# Patient Record
Sex: FEMALE | Race: WHITE
[De-identification: names, ages, dates, MRNs, and addresses within clinical notes are randomized per-mention and may not be internally consistent; named-entity substitution may affect disease eponyms.]

---

## 2017-03-16 ENCOUNTER — HOSPITAL ENCOUNTER (OUTPATIENT)
Dept: HOSPITAL 45 - C.RAD1850 | Age: 62
Discharge: HOME | End: 2017-03-16
Attending: INTERNAL MEDICINE
Payer: COMMERCIAL

## 2017-03-16 DIAGNOSIS — N20.0: Primary | ICD-10-CM

## 2017-03-16 DIAGNOSIS — K59.00: ICD-10-CM

## 2017-03-16 NOTE — DIAGNOSTIC IMAGING REPORT
KUB



CLINICAL HISTORY: Nephrolithiasis.    



COMPARISON STUDY:  CT of the abdomen and pelvis June 1, 2016. 



FINDINGS: Pelvic calcifications likely reflect phleboliths or vascular

calcifications. There is a suspected punctate calculus within the lower pole of

the left kidney. No ureteral calculi are identified. No right renal calculi are

identified although right renal shadow is largely obscured by stool.



IMPRESSION:  



1. Suspected left-sided nephrolithiasis.



2. No ureteral calculi identified.



3. Right renal shadow largely obscured by stool. 







Electronically signed by:  Xavi Walters M.D.

3/16/2017 11:43 AM



Dictated Date/Time:  3/16/2017 11:39 AM

## 2017-05-22 ENCOUNTER — HOSPITAL ENCOUNTER (OUTPATIENT)
Dept: HOSPITAL 45 - C.PAPS | Age: 62
Discharge: HOME | End: 2017-05-22
Attending: OBSTETRICS & GYNECOLOGY
Payer: COMMERCIAL

## 2017-05-22 DIAGNOSIS — Z01.419: Primary | ICD-10-CM

## 2017-07-05 ENCOUNTER — HOSPITAL ENCOUNTER (OUTPATIENT)
Dept: HOSPITAL 45 - C.RAD1850 | Age: 62
Discharge: HOME | End: 2017-07-05
Attending: UROLOGY
Payer: COMMERCIAL

## 2017-07-05 ENCOUNTER — HOSPITAL ENCOUNTER (OUTPATIENT)
Dept: HOSPITAL 45 - C.CTS | Age: 62
Discharge: HOME | End: 2017-07-05
Attending: UROLOGY
Payer: COMMERCIAL

## 2017-07-05 DIAGNOSIS — N20.0: Primary | ICD-10-CM

## 2017-07-05 DIAGNOSIS — K57.30: ICD-10-CM

## 2017-07-05 NOTE — DIAGNOSTIC IMAGING REPORT
ABDOMEN AND PELVIS CT WITHOUT CONTRAST



CT DOSE: 998.73 mGycm



HISTORY: Nephrocalcinosis  N20.0 Nephrolithiasis



TECHNIQUE: Multiaxial CT images of the abdomen and pelvis were performed without

contrast.



COMPARISON STUDY: 6/1/2016



FINDINGS: Minimal chronic interstitial change of the lung bases. Scarlike

density with subtle nodular character anterior medial right phrenic angle. This

measures 6 mm. No additional pulmonary nodular change. Liver spleen and pancreas

appear unremarkable. Right kidney demonstrates a nonobstructing calcification at

its lower pole measuring 4 mm. There is no evidence for a right-sided

obstructing calculus. Left kidney demonstrates several nonobstructing upper pole

renal calcifications. These measure up to 4 mm. Bowel pattern overall is

nonobstructive. The appendix is normal. There are several pelvic vascular

calcifications. There is a component of chronic sigmoid diverticulosis. There is

no evidence for acute diverticulitis.



IMPRESSION: 



1. Several nonobstructing renal calcifications bilaterally.





2. No evidence for an obstructing urinary tract calculus.

3. Chronic sigmoid diverticulosis.

4. No evidence for acute diverticulitis.

5. Possible developing nodular density right base measuring 6 mm. Follow-up per

Fleischner criteria.



Please refer to below summary of Fleischner criteria recommendations for

follow-up of incidental CT nodules (SARAH Farfan, Guidelines for management of

small pulmonary nodules detected on CT scans:  A statement from the Fleischner

[employer]ociety, Radiology 237: [phone].)



SOLID NODULES



Solitary nodule size: <6 mm

*  low risk patients:  no follow-up needed

*  high risk patients:  optional CT at 12 months



Solitary nodule size:  6-8 mm

*  low risk patients:  follow-up at 6-12 months, then consider further follow-up

at 18-24 months

*  high risk patients:  initial follow-up CT at 6-12 months and then at 18-24

months if no change



Solitary nodule size: >8 mm

*  either low or high risk patients - consider follow-up CT at 3 months, and/or

CT-PET, and/or biopsy



Multiple nodules size:  <6 mm

*  low risk patients:  no routine follow-up

*  high risk patients:  optional CT at 12 months



Multiple nodules size:  6-8 mm

*  low risk patients:  follow-up at 3-6 months, then consider further follow-up

at 18-24 months

*  high risk patients:  follow-up at 3-6 months, then at 18-24 months if no

change



Multiple nodules size:  >8 mm

*  low risk patients:  follow-up at 3-6 months, then consider further follow-up

at 18-24 months

*  high risk patients:  follow-up at 3-6 months, then at 18-24 months if no

change



Note:  newly detected indeterminate nodule in persons 35 years of age or older.

*  Low risk patients:  minimal or absent history of smoking and/or other known

risk factors

*  high risk patients:  history of smoking or of other known risk factors (e.g.

first degree relative with lung cancer, or exposure to asbestos, radon, uranium)

*  if a nodule up to 8 mm is partly solid or is ground glass further follow-up

is required after 24 months to exclude possible slow growing adenocarcinoma

(MEGHA)



SUBSOIL NODULES



Solitary pure ground-glass nodule

*  nodule size <6 mm - no CT follow-up required

*  nodule size >=6 mm - follow-up CT at 6-12 months, then every 2 years until 5

years



Solitary part-solid nodule

*  nodule size <6 mm - no CT follow-up required

*  nodule size >=6 mm - follow-up CT at 3-6 months.  If unchanged, and solid

component remains <6 mm, then annual follow-up for 5 years



Multiple subsolid nodules

*  nodule size <6 mm - follow-up CT at 3-6 months, consider further follow-up at

2 and 4 years if stable

*  nodule size >=6 mm - follow-up CT at 3-6 months, subsequent management based

on the most suspicious nodule(s)







        







Electronically signed by:  Austen Dejesus M.D.

7/5/2017 1:48 PM



Dictated Date/Time:  7/5/2017 1:42 PM

## 2017-07-05 NOTE — DIAGNOSTIC IMAGING REPORT
KUB



CLINICAL HISTORY: N20.0 [id number] nephrocalcinosis



COMPARISON STUDY:  3/16/2017 



FINDINGS: 3.5 mm calcification lower pole right kidney most likely unchanged in

the prior exam. No significant left-sided nephrocalcinosis on the current study.

No significant paravertebral calcifications. Bowel pattern is nonobstructive.



IMPRESSION:  

1. Stable lower pole right renal calcification. This is seen in retrospect on

the prior exam.





2. No significant left-sided nephrocalcinosis on the current study 







Electronically signed by:  Austen Dejesus M.D.

7/5/2017 10:17 AM



Dictated Date/Time:  7/5/2017 10:13 AM

## 2017-08-01 ENCOUNTER — HOSPITAL ENCOUNTER (OUTPATIENT)
Dept: HOSPITAL 45 - C.CTS | Age: 62
Discharge: HOME | End: 2017-08-01
Attending: INTERNAL MEDICINE
Payer: COMMERCIAL

## 2017-08-01 DIAGNOSIS — R91.1: Primary | ICD-10-CM

## 2017-08-01 NOTE — DIAGNOSTIC IMAGING REPORT
CHEST CT WITH CONTRAST



CT DOSE: 232.95 mGy.cm



HISTORY:      R91.1 Solitary pulmonary [id number]



TECHNIQUE: Multiaxial CT images of the chest were performed following the

intravenous administration of contrast.  A dose lowering technique was utilized

adhering to the principles of ALARA.



COMPARISON:  Abdomen and pelvis CT 7/5/2017.



FINDINGS: The central airways are patent. No pleural effusions. No pneumothorax.

The left lung is clear. Small focal irregular density within the medial aspect

of the right middle lobe is not significantly changed. This measures

approximately 12 x 8 mm and demonstrates a linear appearance on coronal

reformations. Therefore, this favors an area of scarring. No suspicious

pulmonary nodules identified. No mediastinal or hilar lymphadenopathy. The

visualized liver and spleen are unremarkable. No pleural effusions. Normal

caliber thoracic aorta. The main pulmonary arteries are patent.



IMPRESSION: 

A 12 x 8 mm focal irregular density within the the right middle lobe is not

significantly changed. This favors an area of scarring . However, six-month to

one-year chest CT follow-up can be performed to ensure stability.







Electronically signed by:  Meño Jimenez M.D.

8/1/2017 8:35 AM



Dictated Date/Time:  8/1/2017 8:29 AM

## 2017-09-13 ENCOUNTER — HOSPITAL ENCOUNTER (OUTPATIENT)
Dept: HOSPITAL 45 - C.MAMM | Age: 62
Discharge: HOME | End: 2017-09-13
Attending: OBSTETRICS & GYNECOLOGY
Payer: COMMERCIAL

## 2017-09-13 DIAGNOSIS — Z12.31: Primary | ICD-10-CM

## 2017-09-13 NOTE — MAMMOGRAPHY REPORT
BILATERAL DIGITAL SCREENING MAMMOGRAM TOMOSYNTHESIS WITH CAD: 9/13/2017

CLINICAL HISTORY: Routine screening.  Patient has no complaints.  





TECHNIQUE:  Breast tomosynthesis in addition to standard 2D mammography was performed. Current study 
was also evaluated with a Computer Aided Detection (CAD) system.  



COMPARISON: Comparison is made to exams dated:  9/9/2016 mammogram, 8/28/2015 mammogram, 8/7/2014 shaun
mogram, 8/5/2013 mammogram, 8/2/2012 mammogram, and 8/2/2011 mammogram - [facility]
.   



BREAST COMPOSITION:  There are scattered areas of fibroglandular density in both breasts.  



FINDINGS:  No suspicious masses, calcifications, or areas of architectural distortion are noted in ei
ther breast. There has been no significant interval change compared to prior exams.  Bilateral benign
-appearing calcifications are again noted.



IMPRESSION:  ACR BI-RADS CATEGORY 2: BENIGN

There is no mammographic evidence of malignancy. A 1 year screening mammogram is recommended.  The pa
tient will receive written notification of the results.  





Approximately 10% of breast cancers are not detected with mammography. A negative mammographic report
 should not delay biopsy if a clinically suggestive mass is present.



Aidee Camejo M.D.          

/:9/13/2017 12:21:52  



Imaging Technologist: Dannielle BHATTI)(M), [facility]

letter sent: Normal 1/2  

BI-RADS Code: ACR BI-RADS Category 2: Benign

## 2017-12-21 ENCOUNTER — HOSPITAL ENCOUNTER (OUTPATIENT)
Dept: HOSPITAL 45 - C.RAD1850 | Age: 62
Discharge: HOME | End: 2017-12-21
Attending: INTERNAL MEDICINE
Payer: COMMERCIAL

## 2017-12-21 DIAGNOSIS — R05: Primary | ICD-10-CM

## 2017-12-21 NOTE — DIAGNOSTIC IMAGING REPORT
CHEST 2 VIEWS ROUTINE



HISTORY:      COUGH



COMPARISON: Chest 1/22/2016.



FINDINGS: The lungs are clear. Cardiac silhouette is normal in size. No pleural

effusions. No pneumothorax.



IMPRESSION:

No acute process.







Electronically signed by:  Meño Jimenez M.D.

12/21/2017 4:15 PM



Dictated Date/Time:  12/21/2017 4:14 PM

## 2018-02-15 ENCOUNTER — HOSPITAL ENCOUNTER (OUTPATIENT)
Dept: HOSPITAL 45 - C.CTS | Age: 63
Discharge: HOME | End: 2018-02-15
Attending: INTERNAL MEDICINE
Payer: COMMERCIAL

## 2018-02-15 DIAGNOSIS — R91.1: Primary | ICD-10-CM

## 2018-02-15 DIAGNOSIS — R93.8: ICD-10-CM

## 2018-02-15 NOTE — DIAGNOSTIC IMAGING REPORT
(CHEST) THORAX WITH



CLINICAL HISTORY: 62 years-old Female presenting with ABNORMAL CT, DENSITY OF RT

MIDDLE LOBE. 



TECHNIQUE: Multidetector CT imaging of the chest was performed after the

administration of intravenous contrast. IV contrast: None. A dose lowering

technique was used consistent with the principles of ALARA (as low as reasonably

achievable).



COMPARISON: CT chest from 8/1/2017.



CT DOSE (mGy.cm): The estimated cumulative dose is 310.69 mGycm.



FINDINGS:



 topogram: Unremarkable.



On soft tissue windows, normal thyroid and thoracic inlet. No axillary,

supraclavicular, hilar, or mediastinal lymphadenopathy. Atherosclerosis of the

aorta. Normal heart size. Coronary artery calcification. No pericardial or

pleural effusion. Upper abdomen normal.



On lung windows, previously noted bandlike consolidation in the medial right

middle lobe is unchanged and consistent with atelectasis or scarring. No focal

nodule. Minimal dependent atelectasis noted. Airways patent.



On bone windows, degenerative changes of the spine.



IMPRESSION:

1.  Right middle lobe consolidation consistent with atelectasis or scarring. No

suspicious pulmonary nodule.







Electronically signed by:  Darrell Barba M.D.

2/15/2018 11:44 AM



Dictated Date/Time:  2/15/2018 11:41 AM